# Patient Record
Sex: FEMALE | Race: WHITE | NOT HISPANIC OR LATINO | Employment: OTHER | URBAN - METROPOLITAN AREA
[De-identification: names, ages, dates, MRNs, and addresses within clinical notes are randomized per-mention and may not be internally consistent; named-entity substitution may affect disease eponyms.]

---

## 2018-03-12 ENCOUNTER — TRANSFERRED RECORDS (OUTPATIENT)
Dept: HEALTH INFORMATION MANAGEMENT | Facility: CLINIC | Age: 73
End: 2018-03-12

## 2018-03-20 ENCOUNTER — TRANSFERRED RECORDS (OUTPATIENT)
Dept: HEALTH INFORMATION MANAGEMENT | Facility: CLINIC | Age: 73
End: 2018-03-20

## 2019-09-19 ENCOUNTER — TRANSFERRED RECORDS (OUTPATIENT)
Dept: HEALTH INFORMATION MANAGEMENT | Facility: CLINIC | Age: 74
End: 2019-09-19

## 2021-04-28 ENCOUNTER — TRANSFERRED RECORDS (OUTPATIENT)
Dept: HEALTH INFORMATION MANAGEMENT | Facility: CLINIC | Age: 76
End: 2021-04-28

## 2021-07-28 ENCOUNTER — TELEPHONE (OUTPATIENT)
Dept: OPHTHALMOLOGY | Facility: CLINIC | Age: 76
End: 2021-07-28

## 2021-07-28 NOTE — TELEPHONE ENCOUNTER
Spoke to patient.  Patient was previously followed by Dr. 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  She needs to establish with a new provider and would like to see our neuro-ophthalmology team.  Our neuro-ophthalmologists do not see these diagnoses and would not be able to accommodate.  She could see Dr. Bernardo Heller or general eye provider.     Afshan Wharton on 7/28/2021 at 3:57 PM

## 2022-10-12 ENCOUNTER — TRANSFERRED RECORDS (OUTPATIENT)
Dept: HEALTH INFORMATION MANAGEMENT | Facility: CLINIC | Age: 77
End: 2022-10-12

## 2023-06-23 ENCOUNTER — TRANSFERRED RECORDS (OUTPATIENT)
Dept: HEALTH INFORMATION MANAGEMENT | Facility: CLINIC | Age: 78
End: 2023-06-23

## 2023-06-24 ENCOUNTER — MEDICAL CORRESPONDENCE (OUTPATIENT)
Dept: HEALTH INFORMATION MANAGEMENT | Facility: CLINIC | Age: 78
End: 2023-06-24
Payer: COMMERCIAL

## 2023-06-27 ENCOUNTER — TRANSCRIBE ORDERS (OUTPATIENT)
Dept: OTHER | Age: 78
End: 2023-06-27

## 2023-06-27 DIAGNOSIS — H81.8X9 PERSISTENT POSTURAL-PERCEPTUAL DIZZINESS: Primary | ICD-10-CM

## 2023-11-11 NOTE — TELEPHONE ENCOUNTER
11/11/2024-Request for records and images faxed to [facility] @ 906am    Action Lorena Webb on 12/12/2023 at 1:11 PM    Action Taken Imaging disc and records received. Sent disc to [facility] for processing & Records sent to scanning. -JA     RECORDS RECEIVED FROM:    REASON FOR VISIT: Dizziness    Date of Appt: 1/3/24    NOTES (FOR ALL VISITS) STATUS DETAILS   OFFICE NOTE from referring provider Care Everywhere 6/27/23 (Transcribed Orders), 6/23/23 Carrie Avila MD @[facility]     OFFICE NOTE from other specialist Care Everywhere 4/24/23 Iveth Cortes PA-C  @[facility]    1/31/23, 10/31/22, 8/31/22 Aryan Coreas MD  @[facility]     MEDICATION LIST N/A    IMAGING  (FOR ALL VISITS)     MRI (HEAD, NECK, SPINE) N/A    CT (HEAD, NECK, SPINE) N/A
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
## 2024-01-03 ENCOUNTER — PRE VISIT (OUTPATIENT)
Dept: NEUROLOGY | Facility: CLINIC | Age: 79
End: 2024-01-03

## 2024-01-03 ENCOUNTER — OFFICE VISIT (OUTPATIENT)
Dept: NEUROLOGY | Facility: CLINIC | Age: 79
End: 2024-01-03
Attending: PSYCHIATRY & NEUROLOGY
Payer: COMMERCIAL

## 2024-01-03 VITALS — HEART RATE: 71 BPM | RESPIRATION RATE: 16 BRPM | OXYGEN SATURATION: 99 %

## 2024-01-03 DIAGNOSIS — R42 DIZZINESS: Primary | ICD-10-CM

## 2024-01-03 DIAGNOSIS — G54.0 TOS (THORACIC OUTLET SYNDROME): ICD-10-CM

## 2024-01-03 DIAGNOSIS — H81.8X9 PERSISTENT POSTURAL-PERCEPTUAL DIZZINESS: ICD-10-CM

## 2024-01-03 DIAGNOSIS — I87.1 COMPRESSION OF VEIN: ICD-10-CM

## 2024-01-03 DIAGNOSIS — G40.909 SEIZURE DISORDER (H): ICD-10-CM

## 2024-01-03 PROCEDURE — 99205 OFFICE O/P NEW HI 60 MIN: CPT | Performed by: PSYCHIATRY & NEUROLOGY

## 2024-01-03 RX ORDER — FAMOTIDINE 20 MG/1
20 TABLET, FILM COATED ORAL DAILY
COMMUNITY
Start: 2023-08-04

## 2024-01-03 RX ORDER — RAMIPRIL 10 MG/1
10 CAPSULE ORAL 2 TIMES DAILY
COMMUNITY
Start: 2023-12-20

## 2024-01-03 RX ORDER — PANTOPRAZOLE SODIUM 40 MG/1
1 TABLET, DELAYED RELEASE ORAL DAILY
COMMUNITY
Start: 2023-09-08

## 2024-01-03 RX ORDER — LEVETIRACETAM 250 MG/1
0.5 TABLET ORAL 2 TIMES DAILY
COMMUNITY
Start: 2023-06-21

## 2024-01-03 RX ORDER — ASPIRIN 81 MG/1
81 TABLET ORAL DAILY
COMMUNITY

## 2024-01-03 RX ORDER — HYDROXYZINE HYDROCHLORIDE 25 MG/1
25 TABLET, FILM COATED ORAL EVERY 6 HOURS PRN
COMMUNITY
Start: 2023-12-20

## 2024-01-03 ASSESSMENT — PAIN SCALES - GENERAL: PAINLEVEL: NO PAIN (0)
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
## 2024-01-03 NOTE — NURSING NOTE
Chief Complaint   Patient presents with    New Patient     Pulse 71   Resp 16   SpO2 99%     AIDEN DEEJAY

## 2024-10-03 ENCOUNTER — ANCILLARY PROCEDURE (OUTPATIENT)
Dept: ULTRASOUND IMAGING | Facility: CLINIC | Age: 79
End: 2024-10-03
Attending: PSYCHIATRY & NEUROLOGY
Payer: COMMERCIAL

## 2024-10-03 DIAGNOSIS — I87.1 COMPRESSION OF VEIN: ICD-10-CM

## 2024-10-03 DIAGNOSIS — G54.0 TOS (THORACIC OUTLET SYNDROME): ICD-10-CM

## 2024-10-03 PROCEDURE — 93970 EXTREMITY STUDY: CPT | Performed by: RADIOLOGY

## 2024-10-03 PROCEDURE — 93922 UPR/L XTREMITY ART 2 LEVELS: CPT | Performed by: RADIOLOGY

## 2024-10-13 ENCOUNTER — HEALTH MAINTENANCE LETTER (OUTPATIENT)
Age: 79
End: 2024-10-13

## 2024-10-13 DIAGNOSIS — I87.1 COMPRESSION OF VEIN: Primary | ICD-10-CM

## 2024-10-13 DIAGNOSIS — R42 VERTIGO: ICD-10-CM

## 2024-10-13 DIAGNOSIS — M54.2 NECK PAIN: ICD-10-CM

## 2024-10-24 ENCOUNTER — ANCILLARY PROCEDURE (OUTPATIENT)
Dept: CT IMAGING | Facility: CLINIC | Age: 79
End: 2024-10-24
Attending: PSYCHIATRY & NEUROLOGY
Payer: COMMERCIAL

## 2024-10-24 PROCEDURE — 70498 CT ANGIOGRAPHY NECK: CPT | Performed by: RADIOLOGY

## 2024-10-24 PROCEDURE — 70496 CT ANGIOGRAPHY HEAD: CPT | Performed by: RADIOLOGY

## 2024-10-24 RX ORDER — IOPAMIDOL 755 MG/ML
70 INJECTION, SOLUTION INTRAVASCULAR ONCE
Status: COMPLETED | OUTPATIENT
Start: 2024-10-24 | End: 2024-10-24

## 2024-10-24 RX ADMIN — IOPAMIDOL 70 ML: 755 INJECTION, SOLUTION INTRAVASCULAR at 13:33

## 2024-10-29 DIAGNOSIS — M24.20 EAGLE'S SYNDROME: ICD-10-CM

## 2024-10-29 DIAGNOSIS — I87.1 COMPRESSION OF VEIN: ICD-10-CM

## 2024-10-29 DIAGNOSIS — G24.3 CERVICAL DYSTONIA: ICD-10-CM

## 2024-10-29 DIAGNOSIS — M54.2 NECK PAIN: ICD-10-CM

## 2024-10-29 DIAGNOSIS — G54.0 TOS (THORACIC OUTLET SYNDROME): Primary | ICD-10-CM

## 2024-10-29 DIAGNOSIS — M43.6 CONTRACTURE OF STERNOCLEIDOMASTOID MUSCLE: ICD-10-CM

## 2024-11-11 ENCOUNTER — THERAPY VISIT (OUTPATIENT)
Dept: PHYSICAL THERAPY | Facility: CLINIC | Age: 79
End: 2024-11-11
Attending: PSYCHIATRY & NEUROLOGY
Payer: COMMERCIAL

## 2024-11-11 DIAGNOSIS — H81.8X9 PERSISTENT POSTURAL-PERCEPTUAL DIZZINESS: ICD-10-CM

## 2024-11-11 DIAGNOSIS — Z91.81 AT RISK FOR FALLING: ICD-10-CM

## 2024-11-11 DIAGNOSIS — H81.11 BENIGN PAROXYSMAL POSITIONAL VERTIGO, RIGHT: Primary | ICD-10-CM

## 2024-11-11 PROCEDURE — 95992 CANALITH REPOSITIONING PROC: CPT | Mod: GP | Performed by: PHYSICAL THERAPIST

## 2024-11-11 PROCEDURE — 97162 PT EVAL MOD COMPLEX 30 MIN: CPT | Mod: GP | Performed by: PHYSICAL THERAPIST
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
## 2024-11-12 PROBLEM — H81.11 BENIGN PAROXYSMAL POSITIONAL VERTIGO, RIGHT: Status: ACTIVE | Noted: 2024-11-12

## 2024-11-12 PROBLEM — H81.8X9 PERSISTENT POSTURAL-PERCEPTUAL DIZZINESS: Status: ACTIVE | Noted: 2024-11-12

## 2024-11-12 PROBLEM — Z91.81 AT RISK FOR FALLING: Status: ACTIVE | Noted: 2024-11-12

## 2024-11-12 NOTE — PROGRESS NOTES
24 1100   Signing Clinician's Name / Credentials   Signing clinician's name / credentials Jaja Rutledge DPT NCS   Functional Gait Assessment (FAROOQ Ching., JUDITH Duarte, et al. (2004))   1. GAIT LEVEL SURFACE 2  (6 seconds and 10 steps)   2. CHANGE IN GAIT SPEED 3  (4.72 seconds and 10 steps)   3. GAIT WITH HORIZONTAL HEAD TURNS 2  (off balance)   4. GAIT WITH VERTICAL HEAD TURNS 2  (tired/exhausted)   5. GAIT AND PIVOT TURN 3   6. STEP OVER OBSTACLE 2   7. GAIT WITH NARROW BASE OF SUPPORT 0   8. GAIT WITH EYES CLOSED 0   9. AMBULATING BACKWARDS 2  (11.78 seconds and 20 steps)   10. STEPS 2  (hard to go down steps alternating with one rail, she would prefer to go sideways tandem gait (right knee pain))   Total Functional Gait Assessment Score   TOTAL SCORE: (MAXIMUM SCORE 30) 18     Functional Gait Assessment (FGA): The FGA assesses postural stability during various walking tasks.   Gait assistive device used: None    Scores of <22 /30 have been correlated with predicting falls in community-dwelling older adults according to Humza & Good 2010.   Scores of <18 /30 have been correlated with increased risk for falls in patients with Parkinsons Disease according to MontoyaBlaine, Smith et al 2014.  Minimal Detectable Change for patients with acute/chronic stroke = 4.2 according to Thimaxx & Abena 2009  Minimal Detectable Change for patients with vestibular disorder = 8 according to Humza & Funk 2010    Assessment (rationale for performing, application to patient s function & care plan): at risk for falls with high level mobility  (Minutes billed as physical performance test):  part of evaluation    Jaja Rutledge DPT, MPT, NCS  Physical Therapist   Board Certified Neurologic Clinical Specialist     [facility], [place] [facility]   [address] [place] [address]   [email or web address]  [email or web address]   [phone]   Clinic: [phone]  Mondays/Wednesdays/Thursdays   Fax: [phone]

## 2024-11-13 ENCOUNTER — THERAPY VISIT (OUTPATIENT)
Dept: PHYSICAL THERAPY | Facility: CLINIC | Age: 79
End: 2024-11-13
Attending: PSYCHIATRY & NEUROLOGY
Payer: COMMERCIAL

## 2024-11-13 DIAGNOSIS — H81.8X9 PERSISTENT POSTURAL-PERCEPTUAL DIZZINESS: ICD-10-CM

## 2024-11-13 DIAGNOSIS — H81.11 BENIGN PAROXYSMAL POSITIONAL VERTIGO, RIGHT: Primary | ICD-10-CM

## 2024-11-13 DIAGNOSIS — Z91.81 AT RISK FOR FALLING: ICD-10-CM

## 2024-11-13 PROCEDURE — 97112 NEUROMUSCULAR REEDUCATION: CPT | Mod: GP,59 | Performed by: PHYSICAL THERAPIST

## 2024-11-13 PROCEDURE — 95992 CANALITH REPOSITIONING PROC: CPT | Mod: GP | Performed by: PHYSICAL THERAPIST

## 2024-12-07 ENCOUNTER — THERAPY VISIT (OUTPATIENT)
Dept: PHYSICAL THERAPY | Facility: CLINIC | Age: 79
End: 2024-12-07
Attending: PSYCHIATRY & NEUROLOGY
Payer: COMMERCIAL

## 2024-12-07 DIAGNOSIS — H81.8X9 PERSISTENT POSTURAL-PERCEPTUAL DIZZINESS: ICD-10-CM

## 2024-12-07 DIAGNOSIS — H81.11 BENIGN PAROXYSMAL POSITIONAL VERTIGO, RIGHT: Primary | ICD-10-CM

## 2024-12-07 DIAGNOSIS — Z91.81 AT RISK FOR FALLING: ICD-10-CM

## 2024-12-07 PROCEDURE — 97112 NEUROMUSCULAR REEDUCATION: CPT | Mod: GP | Performed by: PHYSICAL THERAPIST

## 2024-12-31 ENCOUNTER — THERAPY VISIT (OUTPATIENT)
Dept: PHYSICAL THERAPY | Facility: CLINIC | Age: 79
End: 2024-12-31
Attending: PSYCHIATRY & NEUROLOGY
Payer: COMMERCIAL

## 2024-12-31 DIAGNOSIS — H81.8X9 PERSISTENT POSTURAL-PERCEPTUAL DIZZINESS: ICD-10-CM

## 2024-12-31 DIAGNOSIS — H81.11 BENIGN PAROXYSMAL POSITIONAL VERTIGO, RIGHT: Primary | ICD-10-CM

## 2024-12-31 DIAGNOSIS — Z91.81 AT RISK FOR FALLING: ICD-10-CM

## 2024-12-31 PROCEDURE — 97116 GAIT TRAINING THERAPY: CPT | Mod: GP | Performed by: PHYSICAL THERAPIST

## 2024-12-31 PROCEDURE — 97112 NEUROMUSCULAR REEDUCATION: CPT | Mod: GP | Performed by: PHYSICAL THERAPIST

## 2025-02-03 ENCOUNTER — TELEPHONE (OUTPATIENT)
Dept: NEUROLOGY | Facility: CLINIC | Age: 80
End: 2025-02-03
Payer: COMMERCIAL

## 2025-02-03 NOTE — TELEPHONE ENCOUNTER
Patient confirmed scheduled appointment:  Date: 5/9/25  Time: 3pm  Visit type: Return Neurology  Provider: Katherine  Location: Virtual  Testing/imaging: NA  Additional notes: 4 month follow up    Brooke Leavitt on 2/3/2025 at 1:56 PM

## 2025-02-17 ENCOUNTER — THERAPY VISIT (OUTPATIENT)
Dept: PHYSICAL THERAPY | Facility: CLINIC | Age: 80
End: 2025-02-17
Attending: PSYCHIATRY & NEUROLOGY
Payer: COMMERCIAL

## 2025-02-17 DIAGNOSIS — H81.11 BENIGN PAROXYSMAL POSITIONAL VERTIGO, RIGHT: Primary | ICD-10-CM

## 2025-02-17 DIAGNOSIS — Z91.81 AT RISK FOR FALLING: ICD-10-CM

## 2025-02-17 DIAGNOSIS — H81.8X9 PERSISTENT POSTURAL-PERCEPTUAL DIZZINESS: ICD-10-CM

## 2025-02-17 PROCEDURE — 97112 NEUROMUSCULAR REEDUCATION: CPT | Mod: GP | Performed by: PHYSICAL THERAPIST

## 2025-02-17 PROCEDURE — 97750 PHYSICAL PERFORMANCE TEST: CPT | Mod: GP | Performed by: PHYSICAL THERAPIST

## 2025-02-17 NOTE — PROGRESS NOTES
[facility]                                                                                   OUTPATIENT PHYSICAL THERAPY    PLAN OF TREATMENT FOR OUTPATIENT REHABILITATION   Patient's Last Name, First Name, Ruby Kang YOB: 1945   Provider's Name   [facility]   Medical Record No.  [id number]     Onset Date: 10/29/24 (order date)  Start of Care Date: 11/11/24     Medical Diagnosis:  3PD      PT Treatment Diagnosis:  right side BPPV, risk off falls and 3PD Plan of Treatment  Frequency/Duration: 1x a month/ 12 weeks    Certification date from 02/03/25 to 04/28/25         See note for plan of treatment details and functional goals     Dona Rutledge, PT                         I CERTIFY THE NEED FOR THESE SERVICES FURNISHED UNDER        THIS PLAN OF TREATMENT AND WHILE UNDER MY CARE     (Physician attestation of this document indicates review and certification of the therapy plan).              Referring Provider:  Cary ELDER Cha    Initial Assessment  See Epic Evaluation- Start of Care Date: 11/11/24

## 2025-02-17 NOTE — PROGRESS NOTES
02/17/25 1000   Signing Clinician's Name / Credentials   Signing clinician's name / credentials Jaja Rutledge DPT NCS   Functional Gait Assessment (FAROOQ Ching., JUDITH Duarte, et al. (2004))   1. GAIT LEVEL SURFACE 2  (6.03 seconds nad 10 steps, weaves a bit sometimes not always)   2. CHANGE IN GAIT SPEED 3  (4.69 seconds and 10 steps)   3. GAIT WITH HORIZONTAL HEAD TURNS 2  (weaves, makes her nervous)   4. GAIT WITH VERTICAL HEAD TURNS 3  (no problem today with this)   5. GAIT AND PIVOT TURN 3   6. STEP OVER OBSTACLE 3   7. GAIT WITH NARROW BASE OF SUPPORT 0   8. GAIT WITH EYES CLOSED 0  (insistent that she has nto been able to do this since a seizure many years ago. she did evntually walk about 10 feet for me very slowly with ECs. arms guarded)   9. AMBULATING BACKWARDS 2  (18.72 seconds and 27 steps first time, she wanted to repeat it and did 12.08 seconds and 20 steps)   10. STEPS 2  (right knee bothers her so she would prefer tandem to descend)   Total Functional Gait Assessment Score   TOTAL SCORE: (MAXIMUM SCORE 30) 20     Functional Gait Assessment (FGA): The FGA assesses postural stability during various walking tasks.   Gait assistive device used: None    Scores of <22 /30 have been correlated with predicting falls in community-dwelling older adults according to Humza & Good 2010.   Scores of <18 /30 have been correlated with increased risk for falls in patients with Parkinsons Disease according to MontoyaBlaine, Smith et al 2014.  Minimal Detectable Change for patients with acute/chronic stroke = 4.2 according to Thieme & Ritschel 2009  Minimal Detectable Change for patients with vestibular disorder = 8 according to Humza & Funk 2010    Assessment (rationale for performing, application to patient s function & care plan): at risk for falls with high level mobility (she does not do these difficult tasks partly out of fear)  (Minutes billed as physical performance test):  15    Jaja Rutledge DPT, MPT,  NCS  Physical Therapist   Board Certified Neurologic Clinical Specialist     [facility], [facility]   [address] [place] [address]   [email or web address]   [phone]   Clinic: [phone] //   Fax: [phone]

## 2025-02-18 PROBLEM — H81.11 BENIGN PAROXYSMAL POSITIONAL VERTIGO, RIGHT: Status: RESOLVED | Noted: 2024-11-12 | Resolved: 2025-02-18

## 2025-02-18 PROBLEM — H81.8X9 PERSISTENT POSTURAL-PERCEPTUAL DIZZINESS: Status: RESOLVED | Noted: 2024-11-12 | Resolved: 2025-02-18

## 2025-02-18 PROBLEM — Z91.81 AT RISK FOR FALLING: Status: RESOLVED | Noted: 2024-11-12 | Resolved: 2025-02-18

## 2025-02-18 NOTE — PROGRESS NOTES
Ruby was seen for 6 visits from 11/11/24 to 2/17/25. Treatment focused on right side habituation ex for positional vertigo, a walking program and head motion exercises. Her 3PD and vertigo are chronic from 2017 and I was clear that this would take time to improve. She did make some progress with a consistent walking program at home and she did do the habituation ex at home but she did not feel it improved much. Her anxiety about going to grocery store (busy visual environment) also improved. Her anxiety/fear are factors in her activity level. Her bilateral lower leg numbness really bothers her. A repeat EMG is set up for 7/31 (on wait list). The plan at this time is for her to switch to ortho PT to treat TOS and her neck. I do think we can try more habituation for the visually induced dizziness in the future.      02/17/25 0500   PT Goal 1   Goal Identifier DHI   Goal Description Improve on DHI from 72/100 to 50/100 or less   Rationale to maximize safety and independence with performance of ADLs and functional tasks;to maximize safety and independence within the home;to maximize safety and independence within the community;to maximize safety and independence with self cares   Goal Progress no change on the DHI as she cannot seperate her dizziness symptoms from her leg symptoms.   Target Date 02/02/25   Date Met 02/17/25   PT Goal 2   Goal Identifier amb with head motion   Goal Description she will be able to amb with head motion horizontal and vertical with no change in gait speed or quality (3/3 on FGA)   Rationale to maximize safety and independence with performance of ADLs and functional tasks;to maximize safety and independence within the home;to maximize safety and independence within the community;to maximize safety and independence with self cares   Goal Progress improved for vertical head motion but not normal for horizontal had motion   Target Date 02/02/25   Date Met 02/17/25     Jaja Rutledge DPT, MPT,  NCS  Physical Therapist   Board Certified Neurologic Clinical Specialist     [facility], [place] [facility]   [address] [place] [address]   [email or web address]  [email or web address]   [phone]   Clinic: [phone] //   Fax: [phone]

## 2025-03-05 ENCOUNTER — THERAPY VISIT (OUTPATIENT)
Dept: PHYSICAL THERAPY | Facility: CLINIC | Age: 80
End: 2025-03-05
Attending: PSYCHIATRY & NEUROLOGY
Payer: COMMERCIAL

## 2025-03-05 DIAGNOSIS — M43.6 CONTRACTURE OF STERNOCLEIDOMASTOID MUSCLE: ICD-10-CM

## 2025-03-05 DIAGNOSIS — M24.20 EAGLE'S SYNDROME: ICD-10-CM

## 2025-03-05 DIAGNOSIS — I87.1 COMPRESSION OF VEIN: ICD-10-CM

## 2025-03-05 DIAGNOSIS — M54.2 NECK PAIN: ICD-10-CM

## 2025-03-05 DIAGNOSIS — G24.3 CERVICAL DYSTONIA: ICD-10-CM

## 2025-03-05 DIAGNOSIS — G54.0 TOS (THORACIC OUTLET SYNDROME): ICD-10-CM

## 2025-03-05 PROCEDURE — 97110 THERAPEUTIC EXERCISES: CPT | Mod: GP | Performed by: PHYSICAL THERAPIST

## 2025-03-05 PROCEDURE — 97140 MANUAL THERAPY 1/> REGIONS: CPT | Mod: GP | Performed by: PHYSICAL THERAPIST

## 2025-03-05 PROCEDURE — 97162 PT EVAL MOD COMPLEX 30 MIN: CPT | Mod: GP | Performed by: PHYSICAL THERAPIST

## 2025-03-05 NOTE — PROGRESS NOTES
PHYSICAL THERAPY EVALUATION  Type of Visit: Evaluation        Fall Risk Screen:  Fall screen completed by: PT  Have you fallen 2 or more times in the past year?: No  Have you fallen and had an injury in the past year?: No  Is patient a fall risk?: No    Subjective         Presenting condition or subjective complaint: balance issues due to PPPD and vision.  Has been treated for BPPV and now MD wants her to have RX for tight muscles.    Date of onset: 01/31/25 (MD referral date)    Relevant medical history: Dizziness; Hearing problems; High blood pressure; Seizures; Vision problems   Dates & types of surgery:      Prior diagnostic imaging/testing results: EMG     Prior therapy history for the same diagnosis, illness or injury: Yes head therapy from [facility]    Prior Level of Function  Transfers: Independent  Ambulation: Independent  ADL: Independent  IADL:     Living Environment  Social support: Alone   Type of home: House; Basement   Stairs to enter the home:         Ramp: No   Stairs inside the home: Yes       Help at home: None  Equipment owned: Walker with wheels     Employment: No    Hobbies/Interests:      Patient goals for therapy:      Pain assessment:      Objective   CERVICAL SPINE EVALUATION  PAIN: Pain Level at Rest: 0/10  Pain Level with Use: 3/10  Pain Location: cervical spine and thoracic spine  Pain Quality: Aching and tight  Pain Frequency: intermittent  Pain is Worst: daytime  Pain is Exacerbated By: Sit to stand gets dizzy.    Pain is Relieved By: rest  Pain Progression: Unchanged  INTEGUMENTARY (edema, incisions):   POSTURE: Standing Posture: Rounded shoulders, Forward head  Sitting Posture: Rounded shoulders, Forward head  GAIT:   Weightbearing Status:   Assistive Device(s):   Gait Deviations:   BALANCE/PROPRIOCEPTION:   WEIGHTBEARING ALIGNMENT:   ROM:   (Degrees) Left AROM Right AROM    Cervical Flexion Full with pain     Cervical Extension Min loss with pain     Cervical Side bend       Cervical Rotation Decreased 25% Decreased 25%    Cervical Protrusion     Cervical Retraction     Thoracic Flexion     Thoracic Extension     Thoracic Rotation       Left AROM Left PROM Right AROM Right PROM   Shoulder Flexion full  full    Shoulder Extension       Shoulder Abduction       Shoulder Adduction       Shoulder IR       Shoulder ER       Shoulder Horiz Abduction       Shoulder Horiz Adduction       Pain:   End Feel:     MYOTOMES: WNL  DTR S: WNL  CORD SIGNS:   DERMATOMES: WNL  NEURAL TENSION:   FLEXIBILITY:    SPECIAL TESTS: WNL  PALPATION:   + Tenderness At Location Left Right   Sternocleidomastoid + +   Scalenes + +   Rhomboids + +   Facet + +   Upper Trap + +   Levator + +   Erector Spinae + +   Suboccipitals + +     SPINAL SEGMENTAL CONCLUSIONS:  hypo throughout c-spine      Assessment & Plan   CLINICAL IMPRESSIONS  Medical Diagnosis: TOS/ neck pain    Treatment Diagnosis: neck pain/ TOS   Impression/Assessment: Patient is a 80 year old female with neck complaints.  The following significant findings have been identified: Decreased ROM/flexibility, Decreased joint mobility, Decreased strength, Impaired muscle performance, Decreased activity tolerance, and Impaired posture. These impairments interfere with their ability to perform self care tasks, recreational activities, household chores, driving , household mobility, and community mobility as compared to previous level of function.     Clinical Decision Making (Complexity):  Clinical Presentation: Evolving/Changing  Clinical Presentation Rationale: based on medical and personal factors listed in PT evaluation  Clinical Decision Making (Complexity): Moderate complexity    PLAN OF CARE  Treatment Interventions:  Interventions: Manual Therapy, Neuromuscular Re-education, Therapeutic Activity, Therapeutic Exercise    Long Term Goals     PT Goal 3  Goal Identifier: neck  Goal Description: full motion in neck without pain  Rationale: to maximize safety  and independence with performance of ADLs and functional tasks;to maximize safety and independence within the home;to maximize safety and independence within the community;to maximize safety and independence with transportation;to maximize safety and independence with self cares  Target Date: 06/02/25      Frequency of Treatment: 2x/ month  Duration of Treatment: 3 months    Recommended Referrals to Other Professionals:   Education Assessment:   Learner/Method: Patient;Family;Demonstration;Pictures/Video    Risks and benefits of evaluation/treatment have been explained.   Patient/Family/caregiver agrees with Plan of Care.     Evaluation Time:     PT Eval, Moderate Complexity Minutes (78415): 20       Signing Clinician: Ángel Barajas, PT        [facility]                                                                                   OUTPATIENT PHYSICAL THERAPY      PLAN OF TREATMENT FOR OUTPATIENT REHABILITATION   Patient's Last Name, First Name, KARMENVelasquezISAURAVelasquez  Ruby Kinsey YOB: 1945   Provider's Name   [facility]   Medical Record No.  [id number]     Onset Date: 01/31/25 (MD referral date)  Start of Care Date: 03/05/25     Medical Diagnosis:  TOS/ neck pain      PT Treatment Diagnosis:  neck pain/ TOS Plan of Treatment  Frequency/Duration: 2x/ month/ 3 months    Certification date from 03/05/25 to 06/02/25         See note for plan of treatment details and functional goals     Ángel Barajas, PT                         I CERTIFY THE NEED FOR THESE SERVICES FURNISHED UNDER        THIS PLAN OF TREATMENT AND WHILE UNDER MY CARE     (Physician attestation of this document indicates review and certification of the therapy plan).              Referring Provider:  Cary ELDER Cha    Initial Assessment  See Epic Evaluation- Start of Care Date: 03/05/25

## 2025-03-06 ENCOUNTER — MEDICAL CORRESPONDENCE (OUTPATIENT)
Dept: HEALTH INFORMATION MANAGEMENT | Facility: CLINIC | Age: 80
End: 2025-03-06
Payer: COMMERCIAL

## 2025-03-20 ENCOUNTER — THERAPY VISIT (OUTPATIENT)
Dept: PHYSICAL THERAPY | Facility: CLINIC | Age: 80
End: 2025-03-20
Payer: COMMERCIAL

## 2025-03-20 DIAGNOSIS — G54.0 TOS (THORACIC OUTLET SYNDROME): ICD-10-CM

## 2025-03-20 DIAGNOSIS — M54.2 NECK PAIN: Primary | ICD-10-CM

## 2025-04-02 ENCOUNTER — TRANSCRIBE ORDERS (OUTPATIENT)
Dept: OTHER | Age: 80
End: 2025-04-02

## 2025-04-02 DIAGNOSIS — M54.50 LOW BACK PAIN: Primary | ICD-10-CM

## 2025-04-02 DIAGNOSIS — M48.00 SPINAL STENOSIS: ICD-10-CM

## 2025-04-09 ENCOUNTER — THERAPY VISIT (OUTPATIENT)
Dept: PHYSICAL THERAPY | Facility: CLINIC | Age: 80
End: 2025-04-09
Payer: COMMERCIAL

## 2025-04-09 DIAGNOSIS — M54.2 NECK PAIN: Primary | ICD-10-CM

## 2025-04-09 DIAGNOSIS — G54.0 TOS (THORACIC OUTLET SYNDROME): ICD-10-CM

## 2025-04-09 PROCEDURE — 97110 THERAPEUTIC EXERCISES: CPT | Mod: GP | Performed by: PHYSICAL THERAPIST

## 2025-04-09 PROCEDURE — 97140 MANUAL THERAPY 1/> REGIONS: CPT | Mod: GP | Performed by: PHYSICAL THERAPIST

## 2025-04-23 ENCOUNTER — THERAPY VISIT (OUTPATIENT)
Dept: PHYSICAL THERAPY | Facility: CLINIC | Age: 80
End: 2025-04-23
Payer: COMMERCIAL

## 2025-04-23 DIAGNOSIS — G54.0 TOS (THORACIC OUTLET SYNDROME): ICD-10-CM

## 2025-04-23 DIAGNOSIS — M54.2 NECK PAIN: Primary | ICD-10-CM

## 2025-04-23 PROCEDURE — 97110 THERAPEUTIC EXERCISES: CPT | Mod: GP | Performed by: PHYSICAL THERAPIST

## 2025-04-23 PROCEDURE — 97140 MANUAL THERAPY 1/> REGIONS: CPT | Mod: GP | Performed by: PHYSICAL THERAPIST

## 2025-05-07 ENCOUNTER — THERAPY VISIT (OUTPATIENT)
Dept: PHYSICAL THERAPY | Facility: CLINIC | Age: 80
End: 2025-05-07
Payer: COMMERCIAL

## 2025-05-07 DIAGNOSIS — M48.061 SPINAL STENOSIS OF LUMBAR REGION WITHOUT NEUROGENIC CLAUDICATION: Primary | ICD-10-CM

## 2025-05-07 DIAGNOSIS — R20.0 LOWER EXTREMITY NUMBNESS: ICD-10-CM

## 2025-05-07 PROCEDURE — 97162 PT EVAL MOD COMPLEX 30 MIN: CPT | Mod: GP | Performed by: PHYSICAL THERAPIST

## 2025-05-07 PROCEDURE — 97110 THERAPEUTIC EXERCISES: CPT | Mod: GP | Performed by: PHYSICAL THERAPIST

## 2025-05-07 NOTE — PROGRESS NOTES
PHYSICAL THERAPY EVALUATION  Type of Visit: Evaluation       Fall Risk Screen:  Have you fallen 2 or more times in the past year?: No  Have you fallen and had an injury in the past year?: No    Subjective         Presenting condition or subjective complaint: leg numbness  Date of onset: 04/02/25 (PT referral date)    Relevant medical history: Arthritis; Dizziness; Hearing problems; Heart problems; High blood pressure; Implanted device; Seizures; Significant weakness; Vision problems   Dates & types of surgery: hip replscement    Prior diagnostic imaging/testing results: Other emg   Prior therapy history for the same diagnosis, illness or injury: No      Prior Level of Function  Transfers: Independent  Ambulation: Independent  ADL: Independent  IADL:     Living Environment  Social support: Alone   Type of home: House   Stairs to enter the home: Yes 2 Is there a railing: Yes     Ramp: No   Stairs inside the home: Yes 17 Is there a railing: Yes     Help at home: None  Equipment owned:       Employment: No    Hobbies/Interests: reading    Patient goals for therapy: yes    Pain assessment: See objective evaluation for additional pain details     Objective   LUMBAR SPINE EVALUATION  Functional disability score (KEELY/STarT Back):  24.44%/low 3  VAS score (0-10): moderate numbness, mild at best      ADDITIONAL HISTORY:  Present symptoms: numbness bottom of her feet, numbness L > R,  stiffness of the knees.  Twinges of left lower back pain the numbness is in the bottom of the L foot, can go up the lateral calf, lateral thigh into the glute.  Did have a viky horse/cramp for the posterior L knee and distal HS region (not sure if due to what is going on now or from her knee surgery and still lacking end range extension).  Paresthesia (yes/no):  yes (present since 2016-17 when the dizziness/BPPV started)  Symptoms (improving/unchanging/worsening):  unchanging to maybe a little better over the past 2 weeks.   Symptoms commenced  as a result of: after the total knee on the L she started to experience increased the numbness for the L LE .  Did have sciatica prior to the TKA.  Had an injection for the L hip which seemed to help with the numbness  Condition occurred in the following environment:   unknown      Symptoms at onset (back/thigh/leg): numbness bottom of her feet  Constant symptoms (back/thigh/leg): numbness L LE, B plantar feet   Intermittent symptoms (back/thigh/leg): R LE, twinge for the L lower back     Symptoms are made worse with the following:  Rising from sit,  Walking, and standing after sitting  Symptoms are made better with the following: standing, may feel a bit better since starting PT for the neck.   After walking legs are stiff but notes feeling better overall.     Disturbed sleep (yes/no):  no Sleeping postures (prone/sup/side R/L): side R > L    Previous history: Sciatica 2024; back went out 1990 but no issues since then until the sciatica in 2024  Previous treatments: none    Specific Questions:  Cough/Sneeze/Strain (pos/neg): lifting  can cause an issue with balance but no increase in symptoms   Bowel/Bladder (normal/abnormal):   no  Gait (normal/abnormal): antalgic gait (R lateral shift)  Medications (nil/NSAIDS/analg/steroids/anticoag/other):  Asprin, Repatha, Keppra, Altace  Medical allergies:  amoxicillin, Buspirone, Clindamycin, Amlodipine, Atorvastatin, Carbamazepine, Lamotrigine, Levetiracetam, Omeprazole, Phenobarbital, Phenytoin  General health (excellent/good/fair/poor):  poor  Pertinent medical history:  currently in PT for neck pain/thoracic outlet syndrome  Imaging (None/Xray/MRI/Other):  MRI - spinal stenosis  Recent or major surgery (yes/no):  L TKA July 2024  Night pain (yes/no): no  Accidents (yes/no): no  Unexplained weight loss (yes/no): no  Barriers at home: no  Other red flags: dizzy issue and seeing PT for neck     Postural Observation:   Sitting: Neutral  Change of posture: No  effect  Standing: decreased lower lumbar lordosis  Lateral Shift: R lateral shift Other Observations: scoliosis with rib hump on the R, Decreased knee extension for the L LE   LROM - FIS poor curve reversal; EIS major loss lower lumbar region, R SGIS nil loss, L SGIS mod loss  Neurological:  Motor Deficit:  3/5 L hip flexion otherwise, symmetrical      Reflexes:  symmetrical B LEs  Sensory Deficit:  symmetrical to light touch     Neurodynamic tests:  (-) seated SLR    Test Movements:   During: produces, abolishes, increases, decreases, no effect, centralizing, peripheralizing   After: better, worse, no better, no worse, no effect, centralized, peripheralized    Symptomatic response Mechanical response    During testing After testing Effect - increased ROM, decreased ROM, or key functional test No Effect   Pretest symptoms standing: Not assessed d/t dizziness/light  headedness     Rep FIS       Rep EIS         Pretest symptoms lying: numbness planar feet    During testing After testing Effect - increased ROM, decreased ROM, or key functional test No Effect   Rep NELLA No Effect    No Effect    Increased ROM for FIS and B SGIS  Improved standing posture  Full strength L hip flexion MMT    Rep EIL         If required, pretest symptoms: not tested   During testing After testing Effect - increased ROM, decreased ROM, or key functional test No Effect   Rep SGIS - R       Rep SGIS - L         Static Tests:       Provisional Classification: Inconclusive/Other - Spinal Stenosis and favorable response to flexion regarding improved LROM and increased strength L hip flexion.  No change in numbness for her feet    Potential Drivers of Pain and/or Disability: Comorbidities, Cognitive-Emotional    Principle of Management:  Education:  discussed trial of lumbar roll to support the thoracic spine.  May develop symptoms for the back as the exercise we are doing will move the lower back.  If increased numbness for the feet with the  exercise, discontinue.  Monitor numbness, ability to rise from sit after sitting for a bit, with doing the exercise   Equipment provided:  none  Mechanical therapy (Y/N):  yes   Extension principle:    Lateral Principle:    Flexion principle:  knees to chest 10 reps, 6 times a day  Other:      Assessment & Plan   CLINICAL IMPRESSIONS  Medical Diagnosis: spinal stenosis - LE numbness    Treatment Diagnosis: spinal stenosis - LE numbness   Impression/Assessment: Patient is a 80 year old female with numbness of the L > R lower extremity complaints.  The following significant findings have been identified: Decreased ROM/flexibility, Decreased joint mobility, Decreased strength, Impaired gait, Impaired muscle performance, Decreased activity tolerance, Impaired posture, and she has dizziness that limits her mobility. These impairments interfere with their ability to perform self care tasks, household chores, household mobility, and community mobility as compared to previous level of function.     Clinical Decision Making (Complexity):  Clinical Presentation: Stable/Uncomplicated  Clinical Presentation Rationale: based on medical and personal factors listed in PT evaluation  Clinical Decision Making (Complexity): Moderate complexity (dizziness limits amount of activity she can tolerate)    PLAN OF CARE  Treatment Interventions:  Interventions: Manual Therapy, Neuromuscular Re-education, Therapeutic Activity, Therapeutic Exercise, Self-Care/Home Management    Long Term Goals     PT Goal 1  Goal Identifier: ambulation  Goal Description: patient will be able to walk/be on her feet for an hour without numbness increasing for either LE nor twinges for the L lower back  Rationale: to maximize safety and independence with performance of ADLs and functional tasks;to maximize safety and independence within the home;to maximize safety and independence within the community  Goal Progress: will try to walk for 20-40' - does not feel  good to do, stiff after but generally will feel better after  Target Date: 07/30/25      Frequency of Treatment: 1 time a week for 6 weeks then every other week for 6 weeks  Duration of Treatment: 12 weeks    Recommended Referrals to Other Professionals:   Education Assessment:        Risks and benefits of evaluation/treatment have been explained.   Patient/Family/caregiver agrees with Plan of Care.     Evaluation Time:        Evaluation Only     Signing Clinician: Karli Valadez, PT        [facility]                                                                                   OUTPATIENT PHYSICAL THERAPY      PLAN OF TREATMENT FOR OUTPATIENT REHABILITATION   Patient's Last Name, First Name, Ruby Kang YOB: 1945   Provider's Name   [facility]   Medical Record No.  [id number]     Onset Date: 04/02/25 (PT referral date)  Start of Care Date: 05/07/25     Medical Diagnosis:  spinal stenosis - LE numbness      PT Treatment Diagnosis:  spinal stenosis - LE numbness Plan of Treatment  Frequency/Duration: 1 time a week for 6 weeks then every other week for 6 weeks/ 12 weeks    Certification date from 05/07/25 to 07/30/25         See note for plan of treatment details and functional goals     Karli Valadez, PT                         I CERTIFY THE NEED FOR THESE SERVICES FURNISHED UNDER        THIS PLAN OF TREATMENT AND WHILE UNDER MY CARE .             Physician Signature               Date    X_____________________________________________________                  Referring Provider:  Nir Singh    Initial Assessment  See Epic Evaluation- Start of Care Date: 05/07/25

## 2025-05-14 ENCOUNTER — THERAPY VISIT (OUTPATIENT)
Dept: PHYSICAL THERAPY | Facility: CLINIC | Age: 80
End: 2025-05-14
Payer: COMMERCIAL

## 2025-05-14 DIAGNOSIS — R20.0 LOWER EXTREMITY NUMBNESS: ICD-10-CM

## 2025-05-14 DIAGNOSIS — M48.061 SPINAL STENOSIS OF LUMBAR REGION WITHOUT NEUROGENIC CLAUDICATION: Primary | ICD-10-CM

## 2025-05-14 PROCEDURE — 97110 THERAPEUTIC EXERCISES: CPT | Mod: GP | Performed by: PHYSICAL THERAPIST

## 2025-06-04 ENCOUNTER — TELEPHONE (OUTPATIENT)
Dept: NEUROLOGY | Facility: CLINIC | Age: 80
End: 2025-06-04

## 2025-06-04 ENCOUNTER — THERAPY VISIT (OUTPATIENT)
Dept: PHYSICAL THERAPY | Facility: CLINIC | Age: 80
End: 2025-06-04
Payer: COMMERCIAL

## 2025-06-04 DIAGNOSIS — M54.2 NECK PAIN: Primary | ICD-10-CM

## 2025-06-04 DIAGNOSIS — G54.0 TOS (THORACIC OUTLET SYNDROME): ICD-10-CM

## 2025-06-04 PROCEDURE — 97110 THERAPEUTIC EXERCISES: CPT | Mod: GP | Performed by: PHYSICAL THERAPIST

## 2025-06-04 PROCEDURE — 97530 THERAPEUTIC ACTIVITIES: CPT | Mod: GP | Performed by: PHYSICAL THERAPIST

## 2025-06-04 NOTE — TELEPHONE ENCOUNTER
Left Voicemail (1st Attempt) and Sent Mychart (1st Attempt) for the patient to call back and schedule the following:    Appointment type: Return Neurology-Virtual  Provider: Katherine  Return date: around 9/9/25  Specialty phone number: [phone]  Additional appointment(s) needed: NA  Additonal Notes: 4 month follow up    Brooke Leavitt on 6/4/2025 at 3:36 PM

## 2025-06-04 NOTE — PROGRESS NOTES
06/04/25 0500   Appointment Info   Signing clinician's name / credentials Stuart Valadez, PT   Total/Authorized Visits 6   Visits Used 5   Medical Diagnosis TOS/ neck pain   PT Tx Diagnosis neck pain/ TOS   Progress Note/Certification   Start of Care Date 03/05/25   Onset of illness/injury or Date of Surgery 01/31/25  (MD referral date)   Therapy Frequency 2x/ month  (may do up to 1 time a week or every other week and alternate weeks for the neck and lower back.)   Predicted Duration 3 months   Certification date from 06/02/25   Certification date to 08/27/25   Progress Note Due Date 06/04/25   Progress Note Completed Date 03/05/25   PT Goal 3   Goal Identifier neck   Goal Description full motion in neck without pain   Rationale to maximize safety and independence with performance of ADLs and functional tasks;to maximize safety and independence within the home;to maximize safety and independence within the community;to maximize safety and independence with transportation;to maximize safety and independence with self cares   Goal Progress improved neck ROM all planes with strain at end range, not pain   Target Date 06/02/25   Subjective Report   Subjective Report Patient notes that she is not feeling symptoms in her neck.  She continues to have dizziness with bending forward, walking on uneven surfaces, going from laying down to sitting up, multidirectional movements with body.  She has been consistent with her home program - supine SOR, cervical isometrics, neck retraction and postural strengthening/scapular stabilization exercises.  does continue to feel that there is an issue with her vision from time to time.  Not sure if coincides with the dizziness or not.  She does feel better overall feel she is better.   Objective Measure 1   Objective Measure palpation   Details hypertonicity bilat UT; less after neck retraction exercises   Objective Measure 2   Objective Measure CROM   Details R Rot 80 deg, L Rot 84, R  SB 33, L SB 32, ext 48   Treatment Interventions (PT)   Interventions Therapeutic Procedure/Exercise;Manual Therapy   Therapeutic Procedure/Exercise - 20   PTRx Ther Proc 1 Cervical Retraction With Patient Overpressure   PTRx Ther Proc 1 - Details 2 sets 10 reps - prod stretch upper cx, no worse after; inc ease with motion.   PTRx Ther Proc 2 Cervical Isometric Extension   PTRx Ther Proc 2 - Details 5 reps with gentle hold in sitting   PTRx Ther Proc 3 Scapular Retraction/Depression   PTRx Ther Proc 3 - Details verbal review for HEP - 1 time a day   PTRx Ther Proc 4 Shoulder Scapular Retraction with Tubing   PTRx Ther Proc 4 - Details verbal review for HEP - 1 time a day   PTRx Ther Proc 5 Shoulder Theraband Rows   PTRx Ther Proc 5 - Details verbal review for HEP - 1 time a day   PTRx Ther Proc 6 Shoulder Theraband Extension   PTRx Ther Proc 6 - Details verbal review for HEP - 1 time a day   Skilled Intervention added neck retraction with self overpressure as has been better since adding retraction over the past few weeks   Patient Response/Progress stretch upper cervical; felt a little like dizziness could come on but ok since sitting; after walking to [place], she did note that her vision felt a little off.   Therapeutic Activity   Therapeutic Activities: dynamic activities to improve functional performance minutes (59506) 30   Ther Act 1 we spent the majority of our session on problem solving and discussion as to how doing the back exercises can also assist with the neck exercises.   Ther Act 1 - Details Pt notes less numbness of the bottom of her feet and less LE symptoms which seems to be helping with her balance  Able to step up on a curb without loss of balance.  Does feel that her posture in standing has improved since starting her exercises - improved erect standing, decreased lateral glide of the back and now shoulders are more level and head is no longer in a side bend posture.  We discussed continuing with  the neck retraction off and on during the day, strengthening 1 time a day.  Do neck ex first then strengthening and determine if dizziness comes on after the strengthening.   Manual Therapy   Skilled Intervention add manual therapy back in, if needed   Education   Learner/Method Patient;Family;Demonstration;Pictures/Video   Plan   Home program PTRX HO   Updates to plan of care added neck retraction and progress to self overpressure.  hold on other neck stretching.  Strengthening 1 time a day - neck isometric and posture/scap stabilization exercises   Plan for next session plan to continue PT for neck and lower back as both areas are improving with PT thus far.  Determine impact of current HEP and advance/alter strengthening as needed.   Comments   Comments progress made with regards to neck pain, ROM and dizziness since starting PT.  Best response has been over the last 3 weeks with adding neck retraction into her home program and doing the lumbar exercises.  Plan to continue PT as symptoms are improving and we are able to progress the neck exercises to assist with motion and dizziness.  Will monitor visual issues as we progress care for the neck.  Dizziness has improved as the head, neck, shoulder and back postures have improved.  Improved sensation on bottom of feet with back ROM (knees to chest and nerve sliders) which has helped pt to be more confident outdoors.   Total Session Time   Timed Code Treatment Minutes 50   Total Treatment Time (sum of timed and untimed services) 50         [facility]                                                                                   OUTPATIENT PHYSICAL THERAPY    PLAN OF TREATMENT FOR OUTPATIENT REHABILITATION   Patient's Last Name, First Name, Ruby Kang YOB: 1945   Provider's Name   [facility]   Medical Record No.  [id number]     Onset Date: 01/31/25 (MD referral date)  Start of  Care Date: 03/05/25     Medical Diagnosis:  TOS/ neck pain      PT Treatment Diagnosis:  neck pain/ TOS Plan of Treatment  Frequency/Duration: (P) 2x/ month (may do up to 1 time a week or every other week and alternate weeks for the neck and lower back.)/ 3 months    Certification date from 06/02/25 to (P) 08/27/25         See note for plan of treatment details and functional goals     Karli Valadez, PT                         I CERTIFY THE NEED FOR THESE SERVICES FURNISHED UNDER        THIS PLAN OF TREATMENT AND WHILE UNDER MY CARE     (Physician attestation of this document indicates review and certification of the therapy plan).              Referring Provider:  Cary ELDER Cha    Initial Assessment  See Epic Evaluation- Start of Care Date: 03/05/25      PLAN  Continue therapy per current plan of care. Will plan 2 time a month for 12 weeks but may alter frequency dependent on lower back/LE response to PT with specific PT for the lower back and LEs (currently in PT for both issues)    Beginning/End Dates of Progress Note Reporting Period:  03/05/25 to 06/04/2025    Referring Provider:  Cary ELDER Cha

## 2025-06-11 ENCOUNTER — THERAPY VISIT (OUTPATIENT)
Dept: PHYSICAL THERAPY | Facility: CLINIC | Age: 80
End: 2025-06-11
Payer: COMMERCIAL

## 2025-06-11 DIAGNOSIS — R20.0 LOWER EXTREMITY NUMBNESS: ICD-10-CM

## 2025-06-11 DIAGNOSIS — M48.061 SPINAL STENOSIS OF LUMBAR REGION WITHOUT NEUROGENIC CLAUDICATION: Primary | ICD-10-CM

## 2025-06-11 PROCEDURE — 97110 THERAPEUTIC EXERCISES: CPT | Mod: GP | Performed by: PHYSICAL THERAPIST

## 2025-06-18 ENCOUNTER — THERAPY VISIT (OUTPATIENT)
Dept: PHYSICAL THERAPY | Facility: CLINIC | Age: 80
End: 2025-06-18
Payer: COMMERCIAL

## 2025-06-18 DIAGNOSIS — R20.0 LOWER EXTREMITY NUMBNESS: ICD-10-CM

## 2025-06-18 DIAGNOSIS — M48.061 SPINAL STENOSIS OF LUMBAR REGION WITHOUT NEUROGENIC CLAUDICATION: Primary | ICD-10-CM

## 2025-06-18 PROCEDURE — 97530 THERAPEUTIC ACTIVITIES: CPT | Mod: GP | Performed by: PHYSICAL THERAPIST

## 2025-06-18 PROCEDURE — 97110 THERAPEUTIC EXERCISES: CPT | Mod: GP | Performed by: PHYSICAL THERAPIST

## 2025-06-25 ENCOUNTER — THERAPY VISIT (OUTPATIENT)
Dept: PHYSICAL THERAPY | Facility: CLINIC | Age: 80
End: 2025-06-25
Payer: COMMERCIAL

## 2025-06-25 DIAGNOSIS — M54.2 NECK PAIN: Primary | ICD-10-CM

## 2025-06-25 DIAGNOSIS — G54.0 TOS (THORACIC OUTLET SYNDROME): ICD-10-CM

## 2025-06-25 PROCEDURE — 97110 THERAPEUTIC EXERCISES: CPT | Mod: GP | Performed by: PHYSICAL THERAPIST

## 2025-07-16 ENCOUNTER — THERAPY VISIT (OUTPATIENT)
Dept: PHYSICAL THERAPY | Facility: CLINIC | Age: 80
End: 2025-07-16
Payer: COMMERCIAL

## 2025-07-16 DIAGNOSIS — M54.2 NECK PAIN: Primary | ICD-10-CM

## 2025-07-16 DIAGNOSIS — G54.0 TOS (THORACIC OUTLET SYNDROME): ICD-10-CM

## 2025-07-16 PROCEDURE — 97110 THERAPEUTIC EXERCISES: CPT | Mod: GP | Performed by: PHYSICAL THERAPIST

## 2025-07-23 ENCOUNTER — THERAPY VISIT (OUTPATIENT)
Dept: PHYSICAL THERAPY | Facility: CLINIC | Age: 80
End: 2025-07-23
Payer: COMMERCIAL

## 2025-07-23 DIAGNOSIS — M48.061 SPINAL STENOSIS OF LUMBAR REGION WITHOUT NEUROGENIC CLAUDICATION: Primary | ICD-10-CM

## 2025-07-23 DIAGNOSIS — R20.0 LOWER EXTREMITY NUMBNESS: ICD-10-CM

## 2025-07-23 PROCEDURE — 97110 THERAPEUTIC EXERCISES: CPT | Mod: GP | Performed by: PHYSICAL THERAPIST

## 2025-07-23 NOTE — PROGRESS NOTES
[facility]                                                                                   OUTPATIENT PHYSICAL THERAPY    PLAN OF TREATMENT FOR OUTPATIENT REHABILITATION   Patient's Last Name, First Name, Ruby Kang YOB: 1945   Provider's Name   [facility]   Medical Record No.  [id number]     Onset Date: 04/02/25 (PT referral date)  Start of Care Date: 05/07/25     Medical Diagnosis:  spinal stenosis - LE numbness      PT Treatment Diagnosis:  spinal stenosis - LE numbness Plan of Treatment  Frequency/Duration: every other week/ 12 weeks    Certification date from 07/23/25 to 10/15/25         See note for plan of treatment details and functional goals     Karli Valadez, PT                         I CERTIFY THE NEED FOR THESE SERVICES FURNISHED UNDER        THIS PLAN OF TREATMENT AND WHILE UNDER MY CARE .             Physician Signature               Date    X_____________________________________________________                  Referring Provider:  Nir Singh    Initial Assessment  See Epic Evaluation- Start of Care Date: 05/07/25            PLAN  Continue therapy per current plan of care.    Beginning/End Dates of Progress Note Reporting Period:  05/07/25 to 07/23/2025    Referring Provider:  Nir Singh     07/23/25 0500   Appointment Info   Signing clinician's name / credentials Stuart Valadez PT   Total/Authorized Visits eval and treat - 11   Visits Used 5   Medical Diagnosis spinal stenosis - LE numbness   PT Tx Diagnosis spinal stenosis - LE numbness   Progress Note/Certification   Start of Care Date 05/07/25   Onset of illness/injury or Date of Surgery 04/02/25  (PT referral date)   Therapy Frequency every other week   Predicted Duration 12 weeks   Certification date from 07/23/25   Certification date to 10/15/25   Progress Note Due Date 07/23/25   Progress Note Completed Date 05/07/25   PT Goal 1    Goal Identifier ambulation   Goal Description patient will be able to walk/be on her feet for an hour without numbness increasing for either LE nor twinges for the L lower back   Rationale to maximize safety and independence with performance of ADLs and functional tasks;to maximize safety and independence within the home;to maximize safety and independence within the community   Goal Progress the dizziness is keeping her inside, and LE limits her balance, as does her  dizziness;  Numbness is better for her LE and feet and numbness is not increasing as quickly when on her feet  (progress made)   Target Date 10/15/25   Subjective Report   Subjective Report Patient relates that her legs are feeling better with taking out the HS stretch/nerve glide.  She is having less numbness for the feet and LEs.  She notes that she still feels awkward when walking on the grass.   Objective Measures   Objective Measures Objective Measure 1;Objective Measure 2;Objective Measure 3;Objective Measure 4   Objective Measure 1   Objective Measure LROM   Details good curve reversal with FIS nil loss;  EIS feels good mod loss, R SGIS mod loss, L SGIS nil loss but bothers R knee   Objective Measure 2   Objective Measure MMT   Details weakness L seated hip flexion 3+/5   Objective Measure 3   Objective Measure posture   Details standing with R lateral shift and rib hump on the R   Objective Measure 4   Details Dizziness limits activity   Treatment Interventions (PT)   Interventions Therapeutic Procedure/Exercise;Therapeutic Activity   Therapeutic Procedure/Exercise   Therapeutic Procedures: strength, endurance, ROM, flexibility minutes (11849) 40   Ther Proc 1 sustained rotation in flexion  (could feel dizziness with lower trunk rotation (supine lying without movement of the head))   Ther Proc 1 - Details supine with knees to the L 2 sets 5+ minutes   PTRx Ther Proc 1 Double Knee to Chest (NELLA)   PTRx Ther Proc 1 - Details 10 reps, 2 sets  "after ea set of sustained rotation in flexion.  -NE during, NE after; inc LROM,   PTRx Ther Proc 2 seated HS curl   PTRx Ther Proc 2 - Details 20 reps ea LE vs green band - NE for either knee.  Felt sym in the L groin with L HS curl, not worse after   PTRx Ther Proc 3 step up/step backwards   PTRx Ther Proc 3 - Details 3\" step   PTRx Ther Proc 4 side lying hip abd   PTRx Ther Proc 4 - Details verbal review - HEP   PTRx Ther Proc 5 side lying clams   PTRx Ther Proc 5 - Details verbal review - HEP   PTRx Ther Proc 6 continue with neck retraction   PTRx Ther Proc 6 - Details single leg balance   PTRx Ther Proc 7 1' ea LE - poor proprioception/balance - constant touches   Skilled Intervention Hyper extends the R knee during ambulation.  Added HS curl, balance and step ups/backwards down  (discussed doing the rotation in flexion and knees to chest before strengthening and balance exercises, then again after.  Answered her questions to the best of my ability regarding her knees feeling \"dumb\" and how strength and nerves can play a part.)   Patient Response/Progress could feel numbness increase on the L with single leg stance.  Did not remain worse after.   Therapeutic Activity   Ther Act 1 pt has many questions regarding dizziness and how her lower body exercises could impact her dizziness.  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   Ther Act 1 - Details difficult to remain focused on one area only during sessions as dizziness impacts all exercises, transitional motions and is very bothersome for patient   PTRx Ther Act 1 Posture Correction with Lumbar Roll   PTRx Ther Act 1 - Details use the roll vertically to support the thoracic spine and monitor whether the symptoms with rise from sit and initially standing after sitting is altered with use of the roll   Education   Learner/Method Patient;No Barriers to Learning;Pictures/Video;Demonstration;Reading;Listening   Education " Comments printed PTRx   Plan   Home program see PTRx   Updates to plan of care added HS curl, balance, step ups   Plan for next session determine impact with focused HEP for lower back   Comments   Comments Patient has made good progress with decreased numbness for the L LE and feet.  She is gaining relief with doing the lumbar rotation in flexion and knees to chest.   Her balance is poor and R knee is also limiting her.  Progress made overall with regards to back and LE numbness.   Total Session Time   Timed Code Treatment Minutes 40   Total Treatment Time (sum of timed and untimed services) 40

## 2025-08-06 ENCOUNTER — THERAPY VISIT (OUTPATIENT)
Dept: PHYSICAL THERAPY | Facility: CLINIC | Age: 80
End: 2025-08-06
Payer: COMMERCIAL

## 2025-08-06 DIAGNOSIS — R20.0 LOWER EXTREMITY NUMBNESS: ICD-10-CM

## 2025-08-06 DIAGNOSIS — M48.061 SPINAL STENOSIS OF LUMBAR REGION WITHOUT NEUROGENIC CLAUDICATION: Primary | ICD-10-CM

## 2025-08-06 PROCEDURE — 97110 THERAPEUTIC EXERCISES: CPT | Mod: GP | Performed by: PHYSICAL THERAPIST

## 2025-08-20 ENCOUNTER — THERAPY VISIT (OUTPATIENT)
Dept: PHYSICAL THERAPY | Facility: CLINIC | Age: 80
End: 2025-08-20
Payer: COMMERCIAL

## 2025-08-20 DIAGNOSIS — R20.0 LOWER EXTREMITY NUMBNESS: ICD-10-CM

## 2025-08-20 DIAGNOSIS — M48.061 SPINAL STENOSIS OF LUMBAR REGION WITHOUT NEUROGENIC CLAUDICATION: Primary | ICD-10-CM

## 2025-08-20 PROCEDURE — 97110 THERAPEUTIC EXERCISES: CPT | Mod: GP | Performed by: PHYSICAL THERAPIST

## 2025-09-03 ENCOUNTER — THERAPY VISIT (OUTPATIENT)
Dept: PHYSICAL THERAPY | Facility: CLINIC | Age: 80
End: 2025-09-03
Payer: COMMERCIAL

## 2025-09-03 DIAGNOSIS — G54.0 TOS (THORACIC OUTLET SYNDROME): ICD-10-CM

## 2025-09-03 DIAGNOSIS — M54.2 NECK PAIN: Primary | ICD-10-CM

## 2025-09-03 PROCEDURE — 97110 THERAPEUTIC EXERCISES: CPT | Mod: GP | Performed by: PHYSICAL THERAPIST
